# Patient Record
Sex: FEMALE | Race: BLACK OR AFRICAN AMERICAN
[De-identification: names, ages, dates, MRNs, and addresses within clinical notes are randomized per-mention and may not be internally consistent; named-entity substitution may affect disease eponyms.]

---

## 2022-08-30 ENCOUNTER — DASHBOARD ENCOUNTERS (OUTPATIENT)
Age: 52
End: 2022-08-30

## 2022-08-30 ENCOUNTER — OFFICE VISIT (OUTPATIENT)
Dept: URBAN - METROPOLITAN AREA CLINIC 80 | Facility: CLINIC | Age: 52
End: 2022-08-30
Payer: COMMERCIAL

## 2022-08-30 ENCOUNTER — OFFICE VISIT (OUTPATIENT)
Dept: URBAN - METROPOLITAN AREA CLINIC 80 | Facility: CLINIC | Age: 52
End: 2022-08-30

## 2022-08-30 ENCOUNTER — WEB ENCOUNTER (OUTPATIENT)
Dept: URBAN - METROPOLITAN AREA CLINIC 80 | Facility: CLINIC | Age: 52
End: 2022-08-30

## 2022-08-30 VITALS
TEMPERATURE: 97.5 F | SYSTOLIC BLOOD PRESSURE: 147 MMHG | HEIGHT: 66 IN | DIASTOLIC BLOOD PRESSURE: 89 MMHG | BODY MASS INDEX: 28.99 KG/M2 | WEIGHT: 180.4 LBS

## 2022-08-30 DIAGNOSIS — K64.8 INTERNAL HEMORRHOIDS: ICD-10-CM

## 2022-08-30 DIAGNOSIS — Z12.11 COLON CANCER SCREENING: ICD-10-CM

## 2022-08-30 PROBLEM — 428283002 HISTORY OF POLYP OF COLON: Status: ACTIVE | Noted: 2022-08-30

## 2022-08-30 PROCEDURE — 99203 OFFICE O/P NEW LOW 30 MIN: CPT | Performed by: INTERNAL MEDICINE

## 2022-08-30 PROCEDURE — 99243 OFF/OP CNSLTJ NEW/EST LOW 30: CPT | Performed by: INTERNAL MEDICINE

## 2022-08-30 RX ORDER — SODIUM SULFATE, MAGNESIUM SULFATE, AND POTASSIUM CHLORIDE 17.75; 2.7; 2.25 G/1; G/1; G/1
12 TABLETS TABLET ORAL
Qty: 24 TABLETS | Refills: 0 | OUTPATIENT
Start: 2022-08-30 | End: 2022-08-31

## 2022-08-30 NOTE — HPI-TODAY'S VISIT:
pt referred by dr. Eddie Lomax for colon cancer screening.  a copy of this note will be sent to referring physician.  pt notes prior colonoscopy 8 yrs ago with on polyps.  told she has IH notes normal stools with occ constipation/straining once every 1 - 3 months no abd pain no n/v normal appetite notes hemorrhoid issues with occ red blooc on wiping.   stools are normal formed brown stable weight no family history of colon cancer or colon polyps no fam hx of gastric cancer  no other complaints

## 2022-10-24 ENCOUNTER — OFFICE VISIT (OUTPATIENT)
Dept: URBAN - METROPOLITAN AREA SURGERY CENTER 19 | Facility: SURGERY CENTER | Age: 52
End: 2022-10-24
Payer: COMMERCIAL

## 2022-10-24 DIAGNOSIS — D12.3 ADENOMA OF TRANSVERSE COLON: ICD-10-CM

## 2022-10-24 DIAGNOSIS — Z12.11 COLON CANCER SCREENING: ICD-10-CM

## 2022-10-24 PROCEDURE — G8907 PT DOC NO EVENTS ON DISCHARG: HCPCS | Performed by: INTERNAL MEDICINE

## 2022-10-24 PROCEDURE — 45385 COLONOSCOPY W/LESION REMOVAL: CPT | Performed by: INTERNAL MEDICINE
